# Patient Record
Sex: MALE | Race: WHITE | URBAN - METROPOLITAN AREA
[De-identification: names, ages, dates, MRNs, and addresses within clinical notes are randomized per-mention and may not be internally consistent; named-entity substitution may affect disease eponyms.]

---

## 2022-03-19 PROBLEM — E78.00 HYPERCHOLESTEROLEMIA: Status: ACTIVE | Noted: 2021-12-20

## 2022-09-01 DIAGNOSIS — E11.65 TYPE 2 DIABETES MELLITUS WITH HYPERGLYCEMIA (HCC): ICD-10-CM

## 2022-09-01 RX ORDER — DAPAGLIFLOZIN 10 MG/1
TABLET, FILM COATED ORAL
Qty: 90 TABLET | Refills: 3 | OUTPATIENT
Start: 2022-09-01

## 2022-09-26 ENCOUNTER — OFFICE VISIT (OUTPATIENT)
Dept: ENDOCRINOLOGY | Age: 57
End: 2022-09-26
Payer: COMMERCIAL

## 2022-09-26 VITALS
HEART RATE: 104 BPM | SYSTOLIC BLOOD PRESSURE: 142 MMHG | DIASTOLIC BLOOD PRESSURE: 74 MMHG | WEIGHT: 162 LBS | OXYGEN SATURATION: 97 %

## 2022-09-26 DIAGNOSIS — R80.9 ALBUMINURIA: ICD-10-CM

## 2022-09-26 DIAGNOSIS — I10 ESSENTIAL HYPERTENSION: ICD-10-CM

## 2022-09-26 DIAGNOSIS — E11.65 TYPE 2 DIABETES MELLITUS WITH HYPERGLYCEMIA, WITH LONG-TERM CURRENT USE OF INSULIN (HCC): Primary | ICD-10-CM

## 2022-09-26 DIAGNOSIS — E78.00 HYPERCHOLESTEROLEMIA: ICD-10-CM

## 2022-09-26 DIAGNOSIS — Z79.4 TYPE 2 DIABETES MELLITUS WITH HYPERGLYCEMIA, WITH LONG-TERM CURRENT USE OF INSULIN (HCC): Primary | ICD-10-CM

## 2022-09-26 LAB — HBA1C MFR BLD: 7.5 %

## 2022-09-26 PROCEDURE — 99214 OFFICE O/P EST MOD 30 MIN: CPT | Performed by: INTERNAL MEDICINE

## 2022-09-26 PROCEDURE — 83036 HEMOGLOBIN GLYCOSYLATED A1C: CPT | Performed by: INTERNAL MEDICINE

## 2022-09-26 RX ORDER — INSULIN GLARGINE 100 [IU]/ML
22 INJECTION, SOLUTION SUBCUTANEOUS NIGHTLY
COMMUNITY
End: 2022-09-26 | Stop reason: SDUPTHER

## 2022-09-26 RX ORDER — INSULIN GLARGINE 100 [IU]/ML
22 INJECTION, SOLUTION SUBCUTANEOUS NIGHTLY
Qty: 30 ML | Refills: 3 | Status: SHIPPED | OUTPATIENT
Start: 2022-09-26

## 2022-09-26 RX ORDER — BLOOD SUGAR DIAGNOSTIC
1 STRIP MISCELLANEOUS DAILY
Qty: 300 EACH | Refills: 3 | Status: SHIPPED | OUTPATIENT
Start: 2022-09-26

## 2022-09-26 RX ORDER — INSULIN ASPART 100 [IU]/ML
INJECTION, SOLUTION INTRAVENOUS; SUBCUTANEOUS
COMMUNITY
End: 2022-09-26 | Stop reason: SDUPTHER

## 2022-09-26 RX ORDER — LISINOPRIL 10 MG/1
10 TABLET ORAL DAILY
Qty: 90 TABLET | Refills: 3 | Status: SHIPPED | OUTPATIENT
Start: 2022-09-26

## 2022-09-26 RX ORDER — SILDENAFIL 50 MG/1
50 TABLET, FILM COATED ORAL PRN
Qty: 7 TABLET | Refills: 11 | Status: SHIPPED | OUTPATIENT
Start: 2022-09-26

## 2022-09-26 RX ORDER — DAPAGLIFLOZIN 10 MG/1
10 TABLET, FILM COATED ORAL EVERY MORNING
Qty: 90 TABLET | Refills: 3 | Status: SHIPPED | OUTPATIENT
Start: 2022-09-26

## 2022-09-26 RX ORDER — INSULIN ASPART 100 [IU]/ML
INJECTION, SOLUTION INTRAVENOUS; SUBCUTANEOUS
Qty: 45 ML | Refills: 3 | Status: SHIPPED | OUTPATIENT
Start: 2022-09-26

## 2022-09-26 NOTE — PROGRESS NOTES
Wesley Lee MD, [address]            Reason for visit: Follow-up of type 2 diabetes mellitus      ASSESSMENT AND PLAN:    1. Type 2 diabetes mellitus with hyperglycemia, with long-term current use of insulin (HCC)  Glycemic control was improved. Blood glucose readings at dinner are higher because he sometimes omits the lunch NovoLog bolus. He will try to be better about taking that injection. No other changes. - AMB POC HEMOGLOBIN A1C  - BASAGLAR KWIKPEN 100 UNIT/ML injection pen; Inject 22 Units into the skin nightly  Dispense: 30 mL; Refill: 3  - NOVOLOG FLEXPEN 100 UNIT/ML injection pen; 13 units with meals plus 2 units / 50 >150 AC/HS (up to 40 units daily),  Dispense: 45 mL; Refill: 3  - FARXIGA 10 MG tablet; Take 1 tablet by mouth every morning  Dispense: 90 tablet; Refill: 3  - lisinopril (PRINIVIL;ZESTRIL) 10 MG tablet; Take 1 tablet by mouth daily  Dispense: 90 tablet; Refill: 3  - Glucagon 1 MG/0.2ML SOSY; Inject 1 mg into the skin as needed (hypoglycemia)  Dispense: 0.2 mL; Refill: 3  - Comprehensive Metabolic Panel; Future  - ACCU-CHEK GUIDE strip; 1 each by In Vitro route daily Check blood glucose 3 times daily  Dispense: 300 each; Refill: 3  - Insulin Pen Needle 32G X 4 MM MISC; 4 injections daily. Dx E11.65  Dispense: 400 each; Refill: 3    2. Essential hypertension  Continue ACE inhibitor therapy. 3. Hypercholesterolemia  - Lipid Panel; Future    4. Albuminuria  Continue ACE inhibitor therapy. - Microalbumin / Creatinine Urine Ratio; Future      Follow-up and Dispositions    Return in about 4 months (around 1/26/2023). History of Present Illness:    DIABETES MELLITUS  Indira Jacobo is here for follow up of type 2 diabetes mellitus. This is currently treated with Basaglar 22 units at bedtime, Novolog 13 units with meals, and Farxiga 10 mg daily.     Date of diagnosis: 2011    Diabetic complications: nephropathy    Diet: No particular diet    Exercise: no regular exercise    Diabetes education: The patient has not received formal diabetes education. Home blood glucose monitoring frequency: 1.6 times per day    Blood glucose: by recall              Fasting mostly 110s-150s              AC lunch mostly 130s-170s               AC supper mostly 130s-140s              bedtime not checked    Hypoglycemia: very rare (less than once per month; lowest recent low blood glucose has been in the 50s). Hemoglobin A1c:  9/20/2012: 12.4%. 4/3/2013: 8.3%. 11/6/2013: 8.0%. 12/3/2014: 9.6%. 9/29/2015: 9.2%. 4/15/2016: 8.8%. 6/16/2017:  9.2%. 10/17/2017:  8.6%. 2/19/2018: 8.4%. 6/19/2018: 7.7%. 5/6/2019: 7.8%. 3/10/2020: 7.4%. 9/9/2020: 7.3%. 3/10/2021: 8.6%. 12/15/2021: 9.2%. 9/26/2022: 7.5%. Microalbumin/nephropathy:  12/2/2014: Urine microalbumin to creatinine ratio 58 (+), serum creatinine 0.81.  4/7/2016: Urine microalbumin to creatinine ratio 195 (+), serum creatinine 0.75.  10/12/2017: Urine microalbumin to creatinine ratio 35.4, serum creatinine 0.72.  9/9/2020: Urine microalbumin to creatinine ratio 8 (-), serum creatinine 0.85.  12/15/2021: Urine microalbumin to creatinine ratio 34 (+), serum creatinine 0.85.  5/10/2022: Urine microalbumin to creatinine ratio 32 (+), serum creatinine 0.81. Neuropathy: No suggestive symptoms    Retinopathy: The patient's last dilated eye examination was 1/24/2018 Julio Cesar Hernandez MD at [facility]). This demonstrated no diabetic retinopathy. Lipids:  4/7/2016:Total cholesterol 185, triglycerides 47, , LDL 72.  10/12/2017: Total cholesterol 190, triglycerides 39, HDL 94, LDL 88.  9/9/2020: Total cholesterol 205, triglycerides 46, , LDL 85.  12/15/2021: Total cholesterol 218, triglycerides 88, HDL 81, .  5/10/2022: Total cholesterol 189, triglycerides 36, HDL 91, LDL 90.     TSH:  12/2/2014: TSH 1.671.   10/12/2017: TSH 0.590, free T4 1.13.  9/9/2020: TSH 0.754.  12/15/2021: TSH 0.962.  5/10/2022: TSH 0.912. Fasting C-peptide:  10/17/2012: 0.23.   10/12/2017: 0.2 (glucose 266). Antibodies:  5/10/2022: ESTEFANY-65 antibodies less than 5.0 (-). Review of Systems    BP (!) 142/74 (Site: Left Upper Arm, Position: Sitting)   Pulse (!) 104   Wt 162 lb (73.5 kg)   SpO2 97%   Wt Readings from Last 3 Encounters:   09/26/22 162 lb (73.5 kg)       Physical Exam  Constitutional:       Appearance: Normal appearance. HENT:      Head: Normocephalic. Neck:      Thyroid: No thyroid mass or thyromegaly. Cardiovascular:      Rate and Rhythm: Normal rate and regular rhythm. Pulmonary:      Effort: Pulmonary effort is normal.      Breath sounds: Normal breath sounds. Neurological:      Mental Status: He is alert. Psychiatric:         Mood and Affect: Mood normal.         Behavior: Behavior normal.       Orders Placed This Encounter   Procedures    Comprehensive Metabolic Panel     Standing Status:   Future     Standing Expiration Date:   9/26/2023    Lipid Panel     Standing Status:   Future     Standing Expiration Date:   9/26/2023    Microalbumin / Creatinine Urine Ratio     Standing Status:   Future     Standing Expiration Date:   9/26/2023    AMB POC HEMOGLOBIN A1C       Current Outpatient Medications   Medication Sig Dispense Refill    BASAGLAR KWIKPEN 100 UNIT/ML injection pen Inject 22 Units into the skin nightly 30 mL 3    NOVOLOG FLEXPEN 100 UNIT/ML injection pen 13 units with meals plus 2 units / 50 >150 AC/HS (up to 40 units daily), 45 mL 3    FARXIGA 10 MG tablet Take 1 tablet by mouth every morning 90 tablet 3    lisinopril (PRINIVIL;ZESTRIL) 10 MG tablet Take 1 tablet by mouth daily 90 tablet 3    Glucagon 1 MG/0.2ML SOSY Inject 1 mg into the skin as needed (hypoglycemia) 0.2 mL 3    sildenafil (VIAGRA) 50 MG tablet Take 50 mg by mouth as needed       No current facility-administered medications for this visit.        Magali Pittman MD, FACE      Portions of this note were generated with the assistance of voice recognition software. As such, some errors in transcription may be present.

## 2023-02-06 DIAGNOSIS — E78.00 HYPERCHOLESTEROLEMIA: ICD-10-CM

## 2023-02-06 DIAGNOSIS — Z79.4 TYPE 2 DIABETES MELLITUS WITH HYPERGLYCEMIA, WITH LONG-TERM CURRENT USE OF INSULIN (HCC): ICD-10-CM

## 2023-02-06 DIAGNOSIS — E11.65 TYPE 2 DIABETES MELLITUS WITH HYPERGLYCEMIA, WITH LONG-TERM CURRENT USE OF INSULIN (HCC): ICD-10-CM

## 2023-02-06 DIAGNOSIS — R80.9 ALBUMINURIA: ICD-10-CM

## 2023-02-06 LAB
ALBUMIN SERPL-MCNC: 4.4 G/DL (ref 3.5–5)
ALBUMIN/GLOB SERPL: 1.5 (ref 0.4–1.6)
ALP SERPL-CCNC: 87 U/L (ref 50–136)
ALT SERPL-CCNC: 17 U/L (ref 12–65)
ANION GAP SERPL CALC-SCNC: 3 MMOL/L (ref 2–11)
AST SERPL-CCNC: 15 U/L (ref 15–37)
BILIRUB SERPL-MCNC: 0.8 MG/DL (ref 0.2–1.1)
BUN SERPL-MCNC: 18 MG/DL (ref 6–23)
CALCIUM SERPL-MCNC: 10 MG/DL (ref 8.3–10.4)
CHLORIDE SERPL-SCNC: 105 MMOL/L (ref 101–110)
CHOLEST SERPL-MCNC: 213 MG/DL
CO2 SERPL-SCNC: 28 MMOL/L (ref 21–32)
CREAT SERPL-MCNC: 0.9 MG/DL (ref 0.8–1.5)
CREAT UR-MCNC: 104 MG/DL
GLOBULIN SER CALC-MCNC: 2.9 G/DL (ref 2.8–4.5)
GLUCOSE SERPL-MCNC: 188 MG/DL (ref 65–100)
HDLC SERPL-MCNC: 98 MG/DL (ref 40–60)
HDLC SERPL: 2.2
LDLC SERPL CALC-MCNC: 105.2 MG/DL
MICROALBUMIN UR-MCNC: 3.34 MG/DL (ref 0–3)
MICROALBUMIN/CREAT UR-RTO: 32 MG/G (ref 0–30)
POTASSIUM SERPL-SCNC: 4.8 MMOL/L (ref 3.5–5.1)
PROT SERPL-MCNC: 7.3 G/DL (ref 6.3–8.2)
SODIUM SERPL-SCNC: 136 MMOL/L (ref 133–143)
TRIGL SERPL-MCNC: 49 MG/DL (ref 35–150)
VLDLC SERPL CALC-MCNC: 9.8 MG/DL (ref 6–23)

## 2023-02-08 ENCOUNTER — OFFICE VISIT (OUTPATIENT)
Dept: ENDOCRINOLOGY | Age: 58
End: 2023-02-08
Payer: COMMERCIAL

## 2023-02-08 VITALS
HEART RATE: 89 BPM | OXYGEN SATURATION: 98 % | WEIGHT: 157 LBS | SYSTOLIC BLOOD PRESSURE: 132 MMHG | DIASTOLIC BLOOD PRESSURE: 72 MMHG

## 2023-02-08 DIAGNOSIS — E78.00 HYPERCHOLESTEROLEMIA: ICD-10-CM

## 2023-02-08 DIAGNOSIS — Z79.4 TYPE 2 DIABETES MELLITUS WITH HYPERGLYCEMIA, WITH LONG-TERM CURRENT USE OF INSULIN (HCC): Primary | ICD-10-CM

## 2023-02-08 DIAGNOSIS — R80.9 ALBUMINURIA: ICD-10-CM

## 2023-02-08 DIAGNOSIS — I10 ESSENTIAL HYPERTENSION: ICD-10-CM

## 2023-02-08 DIAGNOSIS — E11.65 TYPE 2 DIABETES MELLITUS WITH HYPERGLYCEMIA, WITH LONG-TERM CURRENT USE OF INSULIN (HCC): Primary | ICD-10-CM

## 2023-02-08 PROCEDURE — 3078F DIAST BP <80 MM HG: CPT | Performed by: INTERNAL MEDICINE

## 2023-02-08 PROCEDURE — 99214 OFFICE O/P EST MOD 30 MIN: CPT | Performed by: INTERNAL MEDICINE

## 2023-02-08 PROCEDURE — 3075F SYST BP GE 130 - 139MM HG: CPT | Performed by: INTERNAL MEDICINE

## 2023-02-08 RX ORDER — DAPAGLIFLOZIN 10 MG/1
10 TABLET, FILM COATED ORAL EVERY MORNING
Qty: 90 TABLET | Refills: 3 | Status: SHIPPED | OUTPATIENT
Start: 2023-02-08

## 2023-02-08 RX ORDER — BLOOD SUGAR DIAGNOSTIC
1 STRIP MISCELLANEOUS DAILY
Qty: 300 EACH | Refills: 3 | Status: SHIPPED | OUTPATIENT
Start: 2023-02-08

## 2023-02-08 RX ORDER — INSULIN GLARGINE 100 [IU]/ML
22 INJECTION, SOLUTION SUBCUTANEOUS NIGHTLY
Qty: 30 ML | Refills: 3 | Status: SHIPPED | OUTPATIENT
Start: 2023-02-08

## 2023-02-08 RX ORDER — INSULIN ASPART 100 [IU]/ML
INJECTION, SOLUTION INTRAVENOUS; SUBCUTANEOUS
Qty: 60 ML | Refills: 3 | Status: SHIPPED | OUTPATIENT
Start: 2023-02-08

## 2023-02-08 RX ORDER — SILDENAFIL 50 MG/1
50 TABLET, FILM COATED ORAL PRN
Qty: 7 TABLET | Refills: 11 | Status: SHIPPED | OUTPATIENT
Start: 2023-02-08

## 2023-02-08 RX ORDER — LISINOPRIL 10 MG/1
10 TABLET ORAL DAILY
Qty: 90 TABLET | Refills: 3 | Status: SHIPPED | OUTPATIENT
Start: 2023-02-08

## 2023-02-08 NOTE — PROGRESS NOTES
Nyasia Healy MD, [address] [facility]            Reason for visit: Follow-up of type 2 diabetes mellitus      ASSESSMENT AND PLAN:    1. Type 2 diabetes mellitus with hyperglycemia, with long-term current use of insulin (HCC)  Glycemic control is similar to before. He reports improved compliance with taking meal insulin. I will continue Emma Boswell as prescribed. I will increase his NovoLog doses as below. I will have him continue [place] as prescribed. - AMB POC HEMOGLOBIN A1C  - BASAGLAR KWIKPEN 100 UNIT/ML injection pen; Inject 22 Units into the skin nightly  Dispense: 30 mL; Refill: 3  - NOVOLOG FLEXPEN 100 UNIT/ML injection pen; 15 units with meals plus 2 units / 50 >150 AC/HS (up to 60 units daily)  Dispense: 60 mL; Refill: 3  - FARXIGA 10 MG tablet; Take 1 tablet by mouth every morning  Dispense: 90 tablet; Refill: 3  - Insulin Pen Needle 32G X 4 MM MISC; 4 injections daily. Dx E11.65  Dispense: 400 each; Refill: 3  - Glucagon 1 MG/0.2ML SOSY; Inject 1 mg into the skin as needed (hypoglycemia)  Dispense: 0.2 mL; Refill: 3  - ACCU-CHEK GUIDE strip; 1 each by In Vitro route daily Check blood glucose 3 times daily  Dispense: 300 each; Refill: 3    2. Essential hypertension  BP: 132/72   - lisinopril (PRINIVIL;ZESTRIL) 10 MG tablet; Take 1 tablet by mouth daily  Dispense: 90 tablet; Refill: 3    3. Hypercholesterolemia  LDL is above target. Most patients with type 2 diabetes should be on a statin regardless of LDL. I recommend that treatment be considered. Defer to Dr. Romi Paniagua. 4. Albuminuria  Continue ACE inhibitor therapy. - lisinopril (PRINIVIL;ZESTRIL) 10 MG tablet; Take 1 tablet by mouth daily  Dispense: 90 tablet; Refill: 3      Follow-up and Dispositions    Return for 4-6 months. History of Present Illness:    DIABETES MELLITUS  Marlo Stovall is here for follow up of type 2 diabetes mellitus.   This is currently treated with Emma Boswell 22 units at bedtime, Novolog 13 units with meals, and Farxiga 10 mg daily. Date of diagnosis: 2011    Diabetic complications: nephropathy    Diet: No particular diet    Exercise: no regular exercise    Diabetes education: The patient has not received formal diabetes education. Home blood glucose monitoring frequency: 1 time per day    Blood glucose: by review of meter download              Fasting  (mostly 120-180)              AC lunch 112-252 (mostly ~200) (rarely checked)               AC supper not checked              bedtime not checked    Hypoglycemia: very rare (less than once per month; lowest recent low blood glucose has been in the mid 60s). Hemoglobin A1c:  9/20/2012: 12.4%. 4/3/2013: 8.3%. 11/6/2013: 8.0%. 12/3/2014: 9.6%. 9/29/2015: 9.2%. 4/15/2016: 8.8%. 6/16/2017:  9.2%. 10/17/2017:  8.6%. 2/19/2018: 8.4%. 6/19/2018: 7.7%. 5/6/2019: 7.8%. 3/10/2020: 7.4%. 9/9/2020: 7.3%. 3/10/2021: 8.6%. 12/15/2021: 9.2%. 9/26/2022: 7.5%. 2/8/2023: 7.7%. Microalbumin/nephropathy:  12/2/2014: Urine microalbumin to creatinine ratio 58 (+), serum creatinine 0.81.  4/7/2016: Urine microalbumin to creatinine ratio 195 (+), serum creatinine 0.75.  10/12/2017: Urine microalbumin to creatinine ratio 35.4, serum creatinine 0.72.  9/9/2020: Urine microalbumin to creatinine ratio 8 (-), serum creatinine 0.85.  12/15/2021: Urine microalbumin to creatinine ratio 34 (+), serum creatinine 0.85.  5/10/2022: Urine microalbumin to creatinine ratio 32 (+), serum creatinine 0.81.  2/6/2023: Urine microalbumin to creatinine ratio 32 (+), serum creatinine 0.90. Neuropathy: No suggestive symptoms    Retinopathy: The patient's last dilated eye examination was 1/24/2018 Melony Bella MD at [facility] This demonstrated no diabetic retinopathy. Lipids:  4/7/2016:Total cholesterol 185, triglycerides 47, , LDL 72.  10/12/2017:  Total cholesterol 190, triglycerides 39, HDL 94, LDL 88.  9/9/2020: Total cholesterol 205, triglycerides 46, , LDL 85.  12/15/2021: Total cholesterol 218, triglycerides 88, HDL 81, .  5/10/2022: Total cholesterol 189, triglycerides 36, HDL 91, LDL 90.  2/6/2023: Total cholesterol 213, triglycerides 49, HDL 98, .2. TSH:  12/2/2014: TSH 1.671.   10/12/2017: TSH 0.590, free T4 1.13.  9/9/2020: TSH 0.754.  12/15/2021: TSH 0.962.  5/10/2022: TSH 0.912. Fasting C-peptide:  10/17/2012: 0.23.   10/12/2017: 0.2 (glucose 266). Antibodies:  5/10/2022: ESTEFANY-65 antibodies less than 5.0 (-). Review of Systems   Constitutional:  Negative for fatigue and unexpected weight change (intentionally lost ~5 pounds in 4 months). /72 (Site: Left Upper Arm, Position: Sitting)   Pulse 89   Wt 157 lb (71.2 kg)   SpO2 98%   Wt Readings from Last 3 Encounters:   02/08/23 157 lb (71.2 kg)   09/26/22 162 lb (73.5 kg)       Physical Exam  Constitutional:       Appearance: Normal appearance. HENT:      Head: Normocephalic. Neck:      Thyroid: No thyroid mass or thyromegaly. Cardiovascular:      Rate and Rhythm: Normal rate and regular rhythm. Pulmonary:      Effort: Pulmonary effort is normal.      Breath sounds: Normal breath sounds. Neurological:      Mental Status: He is alert. Psychiatric:         Mood and Affect: Mood normal.         Behavior: Behavior normal.       Orders Placed This Encounter   Procedures    AMB POC HEMOGLOBIN A1C       Current Outpatient Medications   Medication Sig Dispense Refill    BASAGLAR KWIKPEN 100 UNIT/ML injection pen Inject 22 Units into the skin nightly 30 mL 3    NOVOLOG FLEXPEN 100 UNIT/ML injection pen 15 units with meals plus 2 units / 50 >150 AC/HS (up to 60 units daily) 60 mL 3    FARXIGA 10 MG tablet Take 1 tablet by mouth every morning 90 tablet 3    lisinopril (PRINIVIL;ZESTRIL) 10 MG tablet Take 1 tablet by mouth daily 90 tablet 3    Insulin Pen Needle 32G X 4 MM MISC 4 injections daily.   Dx E11.65 400 each 3    Glucagon 1 MG/0.2ML SOSY Inject 1 mg into the skin as needed (hypoglycemia) 0.2 mL 3    ACCU-CHEK GUIDE strip 1 each by In Vitro route daily Check blood glucose 3 times daily 300 each 3    sildenafil (VIAGRA) 50 MG tablet Take 1 tablet by mouth as needed for Erectile Dysfunction 7 tablet 11     No current facility-administered medications for this visit. Antonieta Pantoja MD, FACE      Portions of this note were generated with the assistance of voice recognition software. As such, some errors in transcription may be present.

## 2023-02-28 RX ORDER — LANCETS
EACH MISCELLANEOUS
Qty: 300 EACH | Refills: 3 | Status: SHIPPED | OUTPATIENT
Start: 2023-02-28

## 2023-05-16 DIAGNOSIS — E11.65 TYPE 2 DIABETES MELLITUS WITH HYPERGLYCEMIA, WITH LONG-TERM CURRENT USE OF INSULIN (HCC): ICD-10-CM

## 2023-05-16 DIAGNOSIS — Z79.4 TYPE 2 DIABETES MELLITUS WITH HYPERGLYCEMIA, WITH LONG-TERM CURRENT USE OF INSULIN (HCC): ICD-10-CM

## 2023-05-16 RX ORDER — DAPAGLIFLOZIN 10 MG/1
TABLET, FILM COATED ORAL
Qty: 90 TABLET | Refills: 3 | OUTPATIENT
Start: 2023-05-16

## 2023-08-07 ENCOUNTER — OFFICE VISIT (OUTPATIENT)
Dept: ENDOCRINOLOGY | Age: 58
End: 2023-08-07
Payer: COMMERCIAL

## 2023-08-07 VITALS
WEIGHT: 157 LBS | DIASTOLIC BLOOD PRESSURE: 78 MMHG | OXYGEN SATURATION: 96 % | SYSTOLIC BLOOD PRESSURE: 136 MMHG | HEART RATE: 95 BPM

## 2023-08-07 DIAGNOSIS — E11.65 TYPE 2 DIABETES MELLITUS WITH HYPERGLYCEMIA, WITH LONG-TERM CURRENT USE OF INSULIN (HCC): Primary | ICD-10-CM

## 2023-08-07 DIAGNOSIS — Z79.4 TYPE 2 DIABETES MELLITUS WITH HYPERGLYCEMIA, WITH LONG-TERM CURRENT USE OF INSULIN (HCC): Primary | ICD-10-CM

## 2023-08-07 DIAGNOSIS — R80.9 ALBUMINURIA: ICD-10-CM

## 2023-08-07 DIAGNOSIS — I10 ESSENTIAL HYPERTENSION: ICD-10-CM

## 2023-08-07 DIAGNOSIS — E78.00 HYPERCHOLESTEROLEMIA: ICD-10-CM

## 2023-08-07 PROCEDURE — 3078F DIAST BP <80 MM HG: CPT | Performed by: INTERNAL MEDICINE

## 2023-08-07 PROCEDURE — 3075F SYST BP GE 130 - 139MM HG: CPT | Performed by: INTERNAL MEDICINE

## 2023-08-07 PROCEDURE — 99214 OFFICE O/P EST MOD 30 MIN: CPT | Performed by: INTERNAL MEDICINE

## 2023-08-07 RX ORDER — LANCETS
EACH MISCELLANEOUS
Qty: 300 EACH | Refills: 3 | Status: SHIPPED | OUTPATIENT
Start: 2023-08-07

## 2023-08-07 RX ORDER — LISINOPRIL 10 MG/1
10 TABLET ORAL DAILY
Qty: 90 TABLET | Refills: 3 | Status: SHIPPED | OUTPATIENT
Start: 2023-08-07

## 2023-08-07 RX ORDER — DAPAGLIFLOZIN 10 MG/1
10 TABLET, FILM COATED ORAL EVERY MORNING
Qty: 90 TABLET | Refills: 3 | Status: SHIPPED | OUTPATIENT
Start: 2023-08-07

## 2023-08-07 RX ORDER — INSULIN GLARGINE 100 [IU]/ML
22 INJECTION, SOLUTION SUBCUTANEOUS NIGHTLY
Qty: 30 ML | Refills: 3 | Status: SHIPPED | OUTPATIENT
Start: 2023-08-07

## 2023-08-07 RX ORDER — BLOOD SUGAR DIAGNOSTIC
1 STRIP MISCELLANEOUS DAILY
Qty: 300 EACH | Refills: 3 | Status: SHIPPED | OUTPATIENT
Start: 2023-08-07

## 2023-08-07 RX ORDER — INSULIN ASPART 100 [IU]/ML
INJECTION, SOLUTION INTRAVENOUS; SUBCUTANEOUS
Qty: 60 ML | Refills: 3 | Status: SHIPPED | OUTPATIENT
Start: 2023-08-07

## 2023-08-07 NOTE — PROGRESS NOTES
Outpatient Medications   Medication Sig Dispense Refill    BASAGLAR KWIKPEN 100 UNIT/ML injection pen Inject 22 Units into the skin nightly 30 mL 3    NOVOLOG FLEXPEN 100 UNIT/ML injection pen 15 units with meals plus 2 units / 50 >150 AC/HS (up to 60 units daily) 60 mL 3    FARXIGA 10 MG tablet Take 1 tablet by mouth every morning 90 tablet 3    ACCU-CHEK GUIDE strip 1 each by In Vitro route daily Check blood glucose 3 times daily 300 each 3    Glucagon 1 MG/0.2ML SOSY Inject 1 mg into the skin as needed (hypoglycemia) 0.2 mL 3    Insulin Pen Needle 32G X 4 MM MISC 4 injections daily. Dx E11.65 400 each 3    Accu-Chek FastClix Lancets MISC Check blood glucose 3 times daily. E11.65 300 each 3    lisinopril (PRINIVIL;ZESTRIL) 10 MG tablet Take 1 tablet by mouth daily 90 tablet 3    sildenafil (VIAGRA) 50 MG tablet Take 1 tablet by mouth as needed for Erectile Dysfunction 7 tablet 11     No current facility-administered medications for this visit. Leno Lynn MD, FACE      Portions of this note were generated with the assistance of voice recognition software. As such, some errors in transcription may be present.

## 2024-02-09 DIAGNOSIS — Z79.4 TYPE 2 DIABETES MELLITUS WITH HYPERGLYCEMIA, WITH LONG-TERM CURRENT USE OF INSULIN (HCC): ICD-10-CM

## 2024-02-09 DIAGNOSIS — E11.65 TYPE 2 DIABETES MELLITUS WITH HYPERGLYCEMIA, WITH LONG-TERM CURRENT USE OF INSULIN (HCC): ICD-10-CM

## 2024-02-09 DIAGNOSIS — E78.00 HYPERCHOLESTEROLEMIA: ICD-10-CM

## 2024-02-09 DIAGNOSIS — R80.9 ALBUMINURIA: ICD-10-CM

## 2024-02-09 LAB
ALBUMIN SERPL-MCNC: 4.3 G/DL (ref 3.2–4.6)
ALBUMIN/GLOB SERPL: 1.7 (ref 0.4–1.6)
ALP SERPL-CCNC: 79 U/L (ref 50–136)
ALT SERPL-CCNC: 20 U/L (ref 12–65)
ANION GAP SERPL CALC-SCNC: 3 MMOL/L (ref 2–11)
AST SERPL-CCNC: 21 U/L (ref 15–37)
BILIRUB SERPL-MCNC: 1 MG/DL (ref 0.2–1.1)
BUN SERPL-MCNC: 15 MG/DL (ref 8–23)
CALCIUM SERPL-MCNC: 9.3 MG/DL (ref 8.3–10.4)
CHLORIDE SERPL-SCNC: 107 MMOL/L (ref 103–113)
CHOLEST SERPL-MCNC: 190 MG/DL
CO2 SERPL-SCNC: 30 MMOL/L (ref 21–32)
CREAT UR-MCNC: 80 MG/DL
EST. AVERAGE GLUCOSE BLD GHB EST-MCNC: 163 MG/DL
GLOBULIN SER CALC-MCNC: 2.6 G/DL (ref 2.8–4.5)
GLUCOSE SERPL-MCNC: 154 MG/DL (ref 65–100)
HBA1C MFR BLD: 7.3 % (ref 4.8–5.6)
HDLC SERPL-MCNC: 103 MG/DL (ref 40–60)
HDLC SERPL: 1.8
LDLC SERPL CALC-MCNC: 79.4 MG/DL
MICROALBUMIN UR-MCNC: 5.25 MG/DL
MICROALBUMIN/CREAT UR-RTO: 66 MG/G (ref 0–30)
POTASSIUM SERPL-SCNC: 4.1 MMOL/L (ref 3.5–5.1)
PROT SERPL-MCNC: 6.9 G/DL (ref 6.3–8.2)
SODIUM SERPL-SCNC: 140 MMOL/L (ref 136–146)
TRIGL SERPL-MCNC: 38 MG/DL (ref 35–150)
TSH, 3RD GENERATION: 0.81 UIU/ML (ref 0.36–3.74)
VLDLC SERPL CALC-MCNC: 7.6 MG/DL (ref 6–23)

## 2024-02-12 ENCOUNTER — OFFICE VISIT (OUTPATIENT)
Dept: ENDOCRINOLOGY | Age: 59
End: 2024-02-12
Payer: COMMERCIAL

## 2024-02-12 VITALS
DIASTOLIC BLOOD PRESSURE: 80 MMHG | HEART RATE: 103 BPM | SYSTOLIC BLOOD PRESSURE: 154 MMHG | WEIGHT: 157.6 LBS | OXYGEN SATURATION: 98 %

## 2024-02-12 DIAGNOSIS — E11.65 TYPE 2 DIABETES MELLITUS WITH HYPERGLYCEMIA, WITH LONG-TERM CURRENT USE OF INSULIN (HCC): Primary | ICD-10-CM

## 2024-02-12 DIAGNOSIS — E78.00 HYPERCHOLESTEROLEMIA: ICD-10-CM

## 2024-02-12 DIAGNOSIS — I10 ESSENTIAL HYPERTENSION: ICD-10-CM

## 2024-02-12 DIAGNOSIS — R80.9 ALBUMINURIA: ICD-10-CM

## 2024-02-12 DIAGNOSIS — Z79.4 TYPE 2 DIABETES MELLITUS WITH HYPERGLYCEMIA, WITH LONG-TERM CURRENT USE OF INSULIN (HCC): Primary | ICD-10-CM

## 2024-02-12 PROCEDURE — 3051F HG A1C>EQUAL 7.0%<8.0%: CPT | Performed by: INTERNAL MEDICINE

## 2024-02-12 PROCEDURE — 3077F SYST BP >= 140 MM HG: CPT | Performed by: INTERNAL MEDICINE

## 2024-02-12 PROCEDURE — 99214 OFFICE O/P EST MOD 30 MIN: CPT | Performed by: INTERNAL MEDICINE

## 2024-02-12 PROCEDURE — 3079F DIAST BP 80-89 MM HG: CPT | Performed by: INTERNAL MEDICINE

## 2024-02-12 RX ORDER — LISINOPRIL 10 MG/1
10 TABLET ORAL DAILY
Qty: 90 TABLET | Refills: 3 | Status: SHIPPED | OUTPATIENT
Start: 2024-02-12

## 2024-02-12 RX ORDER — INSULIN GLARGINE 100 [IU]/ML
22 INJECTION, SOLUTION SUBCUTANEOUS NIGHTLY
Qty: 30 ML | Refills: 3 | Status: SHIPPED | OUTPATIENT
Start: 2024-02-12

## 2024-02-12 RX ORDER — INSULIN ASPART 100 [IU]/ML
INJECTION, SOLUTION INTRAVENOUS; SUBCUTANEOUS
Qty: 60 ML | Refills: 3 | Status: SHIPPED | OUTPATIENT
Start: 2024-02-12

## 2024-02-12 RX ORDER — DAPAGLIFLOZIN 10 MG/1
10 TABLET, FILM COATED ORAL EVERY MORNING
Qty: 90 TABLET | Refills: 3 | Status: SHIPPED | OUTPATIENT
Start: 2024-02-12

## 2024-02-12 RX ORDER — LANCETS
EACH MISCELLANEOUS
Qty: 300 EACH | Refills: 3 | Status: SHIPPED | OUTPATIENT
Start: 2024-02-12

## 2024-02-12 RX ORDER — BLOOD SUGAR DIAGNOSTIC
1 STRIP MISCELLANEOUS DAILY
Qty: 300 EACH | Refills: 3 | Status: SHIPPED | OUTPATIENT
Start: 2024-02-12

## 2024-02-12 NOTE — PROGRESS NOTES
SUSAN Tan MD, FACE    [facility] ENDOCRINOLOGY   AND   [facility]            Reason for visit: Follow-up of type 2 diabetes mellitus      ASSESSMENT AND PLAN:    1. Type 2 diabetes mellitus with hyperglycemia, with long-term current use of insulin (HCC)  Glycemic control is reasonable.  He has occasional severe hyperglycemia due to missing meal insulin doses.  He does this out of concern for hypoglycemia while working.  I validated that concern but encouraged him that half of the NovoLog dose rather than omitting it altogether.  No other changes.  I have encouraged him to reestablish with his ophthalmologist or optometrist for dilated eye exam.  - BASAGLAR KWIKPEN 100 UNIT/ML injection pen; Inject 22 Units into the skin nightly  Dispense: 30 mL; Refill: 3  - NOVOLOG FLEXPEN 100 UNIT/ML injection pen; 12 units with meals plus 2 units / 50 >150 AC/HS (up to 60 units daily)  Dispense: 60 mL; Refill: 3  - FARXIGA 10 MG tablet; Take 1 tablet by mouth every morning  Dispense: 90 tablet; Refill: 3  - Glucagon 1 MG/0.2ML SOSY; Inject 1 mg into the skin as needed (hypoglycemia)  Dispense: 0.2 mL; Refill: 3  - Insulin Pen Needle 32G X 4 MM MISC; 4 injections daily.  Dx E11.65  Dispense: 400 each; Refill: 3  - ACCU-CHEK GUIDE strip; 1 each by In Vitro route daily Check blood glucose 3 times daily  Dispense: 300 each; Refill: 3  - Accu-Chek FastClix Lancets MISC; Check blood glucose 3 times daily. E11.65  Dispense: 300 each; Refill: 3    2. Essential hypertension  BP: (!) 154/80   - lisinopril (PRINIVIL;ZESTRIL) 10 MG tablet; Take 1 tablet by mouth daily  Dispense: 90 tablet; Refill: 3    3. Hypercholesterolemia  LDL is at target.    4. Albuminuria  Continue ACE inhibitor therapy.  - lisinopril (PRINIVIL;ZESTRIL) 10 MG tablet; Take 1 tablet by mouth daily  Dispense: 90 tablet; Refill: 3      Follow-up and Dispositions    Return in about 6 months (around 8/12/2024).             History of Present

## 2024-08-19 ENCOUNTER — OFFICE VISIT (OUTPATIENT)
Dept: ENDOCRINOLOGY | Age: 59
End: 2024-08-19
Payer: COMMERCIAL

## 2024-08-19 VITALS — SYSTOLIC BLOOD PRESSURE: 130 MMHG | WEIGHT: 156 LBS | DIASTOLIC BLOOD PRESSURE: 80 MMHG

## 2024-08-19 DIAGNOSIS — E11.65 TYPE 2 DIABETES MELLITUS WITH HYPERGLYCEMIA, WITH LONG-TERM CURRENT USE OF INSULIN (HCC): Primary | ICD-10-CM

## 2024-08-19 DIAGNOSIS — I10 ESSENTIAL HYPERTENSION: ICD-10-CM

## 2024-08-19 DIAGNOSIS — E78.00 HYPERCHOLESTEROLEMIA: ICD-10-CM

## 2024-08-19 DIAGNOSIS — R80.9 ALBUMINURIA: ICD-10-CM

## 2024-08-19 DIAGNOSIS — Z79.4 TYPE 2 DIABETES MELLITUS WITH HYPERGLYCEMIA, WITH LONG-TERM CURRENT USE OF INSULIN (HCC): Primary | ICD-10-CM

## 2024-08-19 LAB — HBA1C MFR BLD: 8 %

## 2024-08-19 PROCEDURE — 3075F SYST BP GE 130 - 139MM HG: CPT | Performed by: INTERNAL MEDICINE

## 2024-08-19 PROCEDURE — 99214 OFFICE O/P EST MOD 30 MIN: CPT | Performed by: INTERNAL MEDICINE

## 2024-08-19 PROCEDURE — 3079F DIAST BP 80-89 MM HG: CPT | Performed by: INTERNAL MEDICINE

## 2024-08-19 PROCEDURE — 3051F HG A1C>EQUAL 7.0%<8.0%: CPT | Performed by: INTERNAL MEDICINE

## 2024-08-19 PROCEDURE — 83036 HEMOGLOBIN GLYCOSYLATED A1C: CPT | Performed by: INTERNAL MEDICINE

## 2024-08-19 RX ORDER — INSULIN ASPART 100 [IU]/ML
INJECTION, SOLUTION INTRAVENOUS; SUBCUTANEOUS
Qty: 60 ML | Refills: 3 | Status: SHIPPED | OUTPATIENT
Start: 2024-08-19

## 2024-08-19 RX ORDER — DAPAGLIFLOZIN 10 MG/1
10 TABLET, FILM COATED ORAL EVERY MORNING
Qty: 90 TABLET | Refills: 3 | Status: SHIPPED | OUTPATIENT
Start: 2024-08-19

## 2024-08-19 RX ORDER — BLOOD SUGAR DIAGNOSTIC
1 STRIP MISCELLANEOUS DAILY
Qty: 300 EACH | Refills: 3 | Status: SHIPPED | OUTPATIENT
Start: 2024-08-19

## 2024-08-19 RX ORDER — LISINOPRIL 10 MG/1
10 TABLET ORAL DAILY
Qty: 90 TABLET | Refills: 3 | Status: SHIPPED | OUTPATIENT
Start: 2024-08-19

## 2024-08-19 RX ORDER — INSULIN GLARGINE 100 [IU]/ML
22 INJECTION, SOLUTION SUBCUTANEOUS NIGHTLY
Qty: 30 ML | Refills: 3 | Status: SHIPPED | OUTPATIENT
Start: 2024-08-19

## 2024-08-19 RX ORDER — LANCETS
EACH MISCELLANEOUS
Qty: 300 EACH | Refills: 3 | Status: SHIPPED | OUTPATIENT
Start: 2024-08-19

## 2024-08-19 ASSESSMENT — ENCOUNTER SYMPTOMS
COUGH: 1
VOICE CHANGE: 1

## 2024-08-19 NOTE — PROGRESS NOTES
SUSAN Tan MD, FACE    [facility] ENDOCRINOLOGY   AND   [facility]            Reason for visit: Follow-up of type 2 diabetes mellitus      ASSESSMENT AND PLAN:    1. Type 2 diabetes mellitus with hyperglycemia, with long-term current use of insulin (HCC)  Glycemic control is reasonable.  He has occasional severe hyperglycemia due to missing meal insulin doses.  He does this out of concern for hypoglycemia while working.  I validated that concern but encouraged him that half of the NovoLog dose rather than omitting it altogether.  No other changes.  I have encouraged him to reestablish with his ophthalmologist or optometrist for dilated eye exam.  - BASAGLAR KWIKPEN 100 UNIT/ML injection pen; Inject 22 Units into the skin nightly  Dispense: 30 mL; Refill: 3  - NOVOLOG FLEXPEN 100 UNIT/ML injection pen; 12 units with meals plus 2 units / 50 >150 AC/HS (up to 60 units daily)  Dispense: 60 mL; Refill: 3  - FARXIGA 10 MG tablet; Take 1 tablet by mouth every morning  Dispense: 90 tablet; Refill: 3  - Glucagon 1 MG/0.2ML SOSY; Inject 1 mg into the skin as needed (hypoglycemia)  Dispense: 0.2 mL; Refill: 3  - Insulin Pen Needle 32G X 4 MM MISC; 4 injections daily.  Dx E11.65  Dispense: 400 each; Refill: 3  - ACCU-CHEK GUIDE strip; 1 each by In Vitro route daily Check blood glucose 3 times daily  Dispense: 300 each; Refill: 3  - Accu-Chek FastClix Lancets MISC; Check blood glucose 3 times daily. E11.65  Dispense: 300 each; Refill: 3    2. Essential hypertension  BP: 130/80   - lisinopril (PRINIVIL;ZESTRIL) 10 MG tablet; Take 1 tablet by mouth daily  Dispense: 90 tablet; Refill: 3    3. Hypercholesterolemia  LDL is at target.    4. Albuminuria  Continue ACE inhibitor therapy.  - lisinopril (PRINIVIL;ZESTRIL) 10 MG tablet; Take 1 tablet by mouth daily  Dispense: 90 tablet; Refill: 3      Follow-up and Dispositions    Return in about 6 months (around 2/19/2025).               History of Present

## 2025-02-24 DIAGNOSIS — R80.9 ALBUMINURIA: ICD-10-CM

## 2025-02-24 DIAGNOSIS — E78.00 HYPERCHOLESTEROLEMIA: ICD-10-CM

## 2025-02-24 DIAGNOSIS — Z79.4 TYPE 2 DIABETES MELLITUS WITH HYPERGLYCEMIA, WITH LONG-TERM CURRENT USE OF INSULIN (HCC): ICD-10-CM

## 2025-02-24 DIAGNOSIS — E11.65 TYPE 2 DIABETES MELLITUS WITH HYPERGLYCEMIA, WITH LONG-TERM CURRENT USE OF INSULIN (HCC): ICD-10-CM

## 2025-02-24 LAB
ALBUMIN SERPL-MCNC: 4 G/DL (ref 3.2–4.6)
ALBUMIN/GLOB SERPL: 1.4 (ref 1–1.9)
ALP SERPL-CCNC: 89 U/L (ref 40–129)
ALT SERPL-CCNC: 14 U/L (ref 8–55)
ANION GAP SERPL CALC-SCNC: 10 MMOL/L (ref 7–16)
AST SERPL-CCNC: 24 U/L (ref 15–37)
BILIRUB SERPL-MCNC: 0.7 MG/DL (ref 0–1.2)
BUN SERPL-MCNC: 19 MG/DL (ref 8–23)
CALCIUM SERPL-MCNC: 9.3 MG/DL (ref 8.8–10.2)
CHLORIDE SERPL-SCNC: 103 MMOL/L (ref 98–107)
CHOLEST SERPL-MCNC: 186 MG/DL (ref 0–200)
CO2 SERPL-SCNC: 27 MMOL/L (ref 20–29)
CREAT SERPL-MCNC: 0.72 MG/DL (ref 0.8–1.3)
CREAT UR-MCNC: 98.7 MG/DL (ref 39–259)
EST. AVERAGE GLUCOSE BLD GHB EST-MCNC: 183 MG/DL
GLOBULIN SER CALC-MCNC: 2.9 G/DL (ref 2.3–3.5)
GLUCOSE SERPL-MCNC: 141 MG/DL (ref 70–99)
HBA1C MFR BLD: 8 % (ref 0–5.6)
HDLC SERPL-MCNC: 76 MG/DL (ref 40–60)
HDLC SERPL: 2.4 (ref 0–5)
LDLC SERPL CALC-MCNC: 99 MG/DL (ref 0–100)
MICROALBUMIN UR-MCNC: 14.6 MG/DL (ref 0–20)
MICROALBUMIN/CREAT UR-RTO: 148 MG/G (ref 0–30)
POTASSIUM SERPL-SCNC: 4.3 MMOL/L (ref 3.5–5.1)
PROT SERPL-MCNC: 6.9 G/DL (ref 6.3–8.2)
SODIUM SERPL-SCNC: 140 MMOL/L (ref 136–145)
TRIGL SERPL-MCNC: 55 MG/DL (ref 0–150)
TSH W FREE THYROID IF ABNORMAL: 0.72 UIU/ML (ref 0.27–4.2)
VLDLC SERPL CALC-MCNC: 11 MG/DL (ref 6–23)

## 2025-02-26 ENCOUNTER — OFFICE VISIT (OUTPATIENT)
Dept: ENDOCRINOLOGY | Age: 60
End: 2025-02-26
Payer: COMMERCIAL

## 2025-02-26 VITALS
HEART RATE: 96 BPM | OXYGEN SATURATION: 99 % | SYSTOLIC BLOOD PRESSURE: 130 MMHG | BODY MASS INDEX: 21.4 KG/M2 | DIASTOLIC BLOOD PRESSURE: 78 MMHG | HEIGHT: 72 IN | WEIGHT: 158 LBS

## 2025-02-26 DIAGNOSIS — Z79.4 TYPE 2 DIABETES MELLITUS WITH HYPERGLYCEMIA, WITH LONG-TERM CURRENT USE OF INSULIN (HCC): ICD-10-CM

## 2025-02-26 DIAGNOSIS — R80.9 ALBUMINURIA: ICD-10-CM

## 2025-02-26 DIAGNOSIS — E11.65 TYPE 2 DIABETES MELLITUS WITH HYPERGLYCEMIA, WITH LONG-TERM CURRENT USE OF INSULIN (HCC): ICD-10-CM

## 2025-02-26 DIAGNOSIS — I10 ESSENTIAL HYPERTENSION: ICD-10-CM

## 2025-02-26 PROCEDURE — 3078F DIAST BP <80 MM HG: CPT | Performed by: INTERNAL MEDICINE

## 2025-02-26 PROCEDURE — 3075F SYST BP GE 130 - 139MM HG: CPT | Performed by: INTERNAL MEDICINE

## 2025-02-26 PROCEDURE — 3052F HG A1C>EQUAL 8.0%<EQUAL 9.0%: CPT | Performed by: INTERNAL MEDICINE

## 2025-02-26 PROCEDURE — 99214 OFFICE O/P EST MOD 30 MIN: CPT | Performed by: INTERNAL MEDICINE

## 2025-02-26 RX ORDER — INSULIN GLARGINE 100 [IU]/ML
22 INJECTION, SOLUTION SUBCUTANEOUS NIGHTLY
Qty: 30 ML | Refills: 3 | Status: SHIPPED | OUTPATIENT
Start: 2025-02-26

## 2025-02-26 RX ORDER — DAPAGLIFLOZIN 10 MG/1
10 TABLET, FILM COATED ORAL EVERY MORNING
Qty: 90 TABLET | Refills: 3 | Status: SHIPPED | OUTPATIENT
Start: 2025-02-26

## 2025-02-26 RX ORDER — LANCETS
EACH MISCELLANEOUS
Qty: 300 EACH | Refills: 3 | Status: SHIPPED | OUTPATIENT
Start: 2025-02-26

## 2025-02-26 RX ORDER — LISINOPRIL 10 MG/1
10 TABLET ORAL DAILY
Qty: 90 TABLET | Refills: 3 | Status: SHIPPED | OUTPATIENT
Start: 2025-02-26

## 2025-02-26 RX ORDER — SILDENAFIL 50 MG/1
50 TABLET, FILM COATED ORAL PRN
Qty: 7 TABLET | Refills: 11 | Status: SHIPPED | OUTPATIENT
Start: 2025-02-26

## 2025-02-26 RX ORDER — BLOOD SUGAR DIAGNOSTIC
1 STRIP MISCELLANEOUS DAILY
Qty: 300 EACH | Refills: 3 | Status: SHIPPED | OUTPATIENT
Start: 2025-02-26

## 2025-02-26 RX ORDER — INSULIN ASPART 100 [IU]/ML
INJECTION, SOLUTION INTRAVENOUS; SUBCUTANEOUS
Qty: 60 ML | Refills: 3 | Status: SHIPPED | OUTPATIENT
Start: 2025-02-26

## 2025-02-26 ASSESSMENT — ENCOUNTER SYMPTOMS: VOICE CHANGE: 1

## 2025-03-30 DIAGNOSIS — E11.65 TYPE 2 DIABETES MELLITUS WITH HYPERGLYCEMIA, WITH LONG-TERM CURRENT USE OF INSULIN (HCC): ICD-10-CM

## 2025-03-30 DIAGNOSIS — Z79.4 TYPE 2 DIABETES MELLITUS WITH HYPERGLYCEMIA, WITH LONG-TERM CURRENT USE OF INSULIN (HCC): ICD-10-CM

## 2025-03-31 RX ORDER — INSULIN GLARGINE 100 [IU]/ML
INJECTION, SOLUTION SUBCUTANEOUS
Refills: 5 | OUTPATIENT
Start: 2025-03-31

## 2025-06-13 ENCOUNTER — TELEPHONE (OUTPATIENT)
Dept: ENDOCRINOLOGY | Age: 60
End: 2025-06-13

## 2025-06-13 DIAGNOSIS — E11.65 TYPE 2 DIABETES MELLITUS WITH HYPERGLYCEMIA, WITH LONG-TERM CURRENT USE OF INSULIN (HCC): Primary | ICD-10-CM

## 2025-06-13 DIAGNOSIS — Z79.4 TYPE 2 DIABETES MELLITUS WITH HYPERGLYCEMIA, WITH LONG-TERM CURRENT USE OF INSULIN (HCC): Primary | ICD-10-CM

## 2025-06-13 RX ORDER — GLUCAGON INJECTION, SOLUTION 0.5 MG/.1ML
0.5 INJECTION, SOLUTION SUBCUTANEOUS PRN
Qty: 0.2 ML | Refills: 5 | Status: SHIPPED | OUTPATIENT
Start: 2025-06-13

## 2025-09-02 ENCOUNTER — OFFICE VISIT (OUTPATIENT)
Dept: ENDOCRINOLOGY | Age: 60
End: 2025-09-02
Payer: COMMERCIAL

## 2025-09-02 VITALS
BODY MASS INDEX: 21.78 KG/M2 | DIASTOLIC BLOOD PRESSURE: 72 MMHG | SYSTOLIC BLOOD PRESSURE: 140 MMHG | HEIGHT: 72 IN | HEART RATE: 97 BPM | WEIGHT: 160.8 LBS | OXYGEN SATURATION: 97 %

## 2025-09-02 DIAGNOSIS — R80.9 ALBUMINURIA: ICD-10-CM

## 2025-09-02 DIAGNOSIS — E11.65 TYPE 2 DIABETES MELLITUS WITH HYPERGLYCEMIA, WITH LONG-TERM CURRENT USE OF INSULIN (HCC): Primary | ICD-10-CM

## 2025-09-02 DIAGNOSIS — Z79.4 TYPE 2 DIABETES MELLITUS WITH HYPERGLYCEMIA, WITH LONG-TERM CURRENT USE OF INSULIN (HCC): Primary | ICD-10-CM

## 2025-09-02 DIAGNOSIS — I10 ESSENTIAL HYPERTENSION: ICD-10-CM

## 2025-09-02 LAB — HBA1C MFR BLD: 6.5 %

## 2025-09-02 PROCEDURE — 3078F DIAST BP <80 MM HG: CPT | Performed by: INTERNAL MEDICINE

## 2025-09-02 PROCEDURE — 3044F HG A1C LEVEL LT 7.0%: CPT | Performed by: INTERNAL MEDICINE

## 2025-09-02 PROCEDURE — 99214 OFFICE O/P EST MOD 30 MIN: CPT | Performed by: INTERNAL MEDICINE

## 2025-09-02 PROCEDURE — 3077F SYST BP >= 140 MM HG: CPT | Performed by: INTERNAL MEDICINE

## 2025-09-02 PROCEDURE — 83036 HEMOGLOBIN GLYCOSYLATED A1C: CPT | Performed by: INTERNAL MEDICINE

## 2025-09-02 RX ORDER — ACYCLOVIR 400 MG/1
TABLET ORAL
Qty: 9 EACH | Refills: 3 | Status: SHIPPED | OUTPATIENT
Start: 2025-09-02

## 2025-09-02 RX ORDER — INSULIN ASPART 100 [IU]/ML
INJECTION, SOLUTION INTRAVENOUS; SUBCUTANEOUS
Qty: 60 ML | Refills: 3 | Status: SHIPPED | OUTPATIENT
Start: 2025-09-02

## 2025-09-02 RX ORDER — DAPAGLIFLOZIN 10 MG/1
10 TABLET, FILM COATED ORAL EVERY MORNING
Qty: 90 TABLET | Refills: 3 | Status: SHIPPED | OUTPATIENT
Start: 2025-09-02

## 2025-09-02 RX ORDER — BLOOD SUGAR DIAGNOSTIC
1 STRIP MISCELLANEOUS DAILY
Qty: 300 EACH | Refills: 3 | Status: SHIPPED | OUTPATIENT
Start: 2025-09-02

## 2025-09-02 RX ORDER — INSULIN GLARGINE 100 [IU]/ML
18 INJECTION, SOLUTION SUBCUTANEOUS NIGHTLY
Qty: 5 ADJUSTABLE DOSE PRE-FILLED PEN SYRINGE | Refills: 11 | Status: SHIPPED | OUTPATIENT
Start: 2025-09-02

## 2025-09-02 RX ORDER — LISINOPRIL 10 MG/1
10 TABLET ORAL DAILY
Qty: 90 TABLET | Refills: 3 | Status: SHIPPED | OUTPATIENT
Start: 2025-09-02

## 2025-09-02 RX ORDER — GLUCAGON INJECTION, SOLUTION 0.5 MG/.1ML
0.5 INJECTION, SOLUTION SUBCUTANEOUS PRN
Qty: 0.2 ML | Refills: 5 | Status: SHIPPED | OUTPATIENT
Start: 2025-09-02

## 2025-09-02 RX ORDER — LANCETS
EACH MISCELLANEOUS
Qty: 300 EACH | Refills: 3 | Status: SHIPPED | OUTPATIENT
Start: 2025-09-02

## 2025-09-02 ASSESSMENT — ENCOUNTER SYMPTOMS: VOICE CHANGE: 1
